# Patient Record
Sex: FEMALE | Race: WHITE | URBAN - METROPOLITAN AREA
[De-identification: names, ages, dates, MRNs, and addresses within clinical notes are randomized per-mention and may not be internally consistent; named-entity substitution may affect disease eponyms.]

---

## 2024-03-05 ENCOUNTER — HOME HEALTH ADMISSION (OUTPATIENT)
Dept: HOME HEALTH SERVICES | Facility: HOME HEALTH | Age: 74
End: 2024-03-05
Payer: MEDICARE

## 2024-03-06 ENCOUNTER — HOME CARE VISIT (OUTPATIENT)
Dept: SCHEDULING | Facility: HOME HEALTH | Age: 74
End: 2024-03-06
Payer: MEDICARE

## 2024-03-06 VITALS
OXYGEN SATURATION: 96 % | SYSTOLIC BLOOD PRESSURE: 142 MMHG | TEMPERATURE: 97.1 F | HEART RATE: 72 BPM | DIASTOLIC BLOOD PRESSURE: 72 MMHG | RESPIRATION RATE: 18 BRPM

## 2024-03-06 PROCEDURE — G0299 HHS/HOSPICE OF RN EA 15 MIN: HCPCS

## 2024-03-06 ASSESSMENT — ENCOUNTER SYMPTOMS
PAIN LOCATION - PAIN QUALITY: ACHY
DYSPNEA ACTIVITY LEVEL: AFTER AMBULATING 10 - 20 FT

## 2024-03-06 NOTE — HOME HEALTH
Patient is a 73 y/o/f with past medical hx of htn, mdd, amy, and self reported bipolar, seen today for rn soc after recent dc from rehab as she fell getting out of her car, presents a+o x3, vs wnl, reports mild back pain, lung sounds clear, does report some sob on exertion, bowel sounds present, reports adequate food/fluid intake, denies issues with elimination or constipation, pt mood mildly depressed as she was living independent prior to moving into [facility], pt currently residing in pcu awaiting villa, pt reports hx of anxiety that is mostly controlled with medication regimen, endorses hx of poor sleep, thought process well organized and goal directed, insight/judgement appears fair, pt agreeable to services, pt added for decreased mobility/endurance, and hx of falls, all consents signed, all questions and concerns addressed, will continue to monitor,   Caregiver involvement: via [facility]  Medications reconciled and all medications are available in nursing station.  Home health supplies by type and quantity ordered/delivered this visit include: na  Patient education provided this visit: SN educated patient and patient's caregiver on admission process, call us first procedure, fall precautions, s/s of infection  Patient and patient caregiver verbalizes understanding via the teach back method.   Progress toward goals: progressing well  Home exercise program: pt/ot eval week of 3/11/24  Plan for next visit: disease/medication management   The following discharge planning was discussed with the patient/ patient's caregiver: DC when goals met

## 2024-03-11 ENCOUNTER — HOME CARE VISIT (OUTPATIENT)
Dept: SCHEDULING | Facility: HOME HEALTH | Age: 74
End: 2024-03-11
Payer: MEDICARE

## 2024-03-11 VITALS — DIASTOLIC BLOOD PRESSURE: 78 MMHG | HEART RATE: 75 BPM | SYSTOLIC BLOOD PRESSURE: 120 MMHG

## 2024-03-11 PROCEDURE — G0153 HHCP-SVS OF S/L PATH,EA 15MN: HCPCS

## 2024-03-11 PROCEDURE — G0151 HHCP-SERV OF PT,EA 15 MIN: HCPCS

## 2024-03-11 NOTE — HOME HEALTH
Patient seen this visit for memory and cognitive evaluation. Patient reports new move in at the [facility]. Patient reports previous hospitalization from a fall that has led her to require increased care. Patient reports poor word finding and short-term memory. Patients judgment and problem solving is poor which can impact patient safety at the [facility]. ST administered the BCAT. Patient recieved a score of 28/50 indicating mild dementia. Patients within this score range typically requires redidential support needs including medication managment and 24/7 supervision. Patients short-term recall and delayed recall requires moderate cues. Patient would like to move to a villa with less caregiver support. Patient would benefit from problem solvig and judgment skills to improve safety and complete ADL's at the [facility].

## 2024-03-12 VITALS
DIASTOLIC BLOOD PRESSURE: 68 MMHG | HEART RATE: 70 BPM | SYSTOLIC BLOOD PRESSURE: 120 MMHG | TEMPERATURE: 97.2 F | RESPIRATION RATE: 18 BRPM

## 2024-03-13 ENCOUNTER — HOME CARE VISIT (OUTPATIENT)
Dept: SCHEDULING | Facility: HOME HEALTH | Age: 74
End: 2024-03-13
Payer: MEDICARE

## 2024-03-13 VITALS
TEMPERATURE: 98.2 F | OXYGEN SATURATION: 94 % | RESPIRATION RATE: 18 BRPM | HEART RATE: 79 BPM | DIASTOLIC BLOOD PRESSURE: 72 MMHG | SYSTOLIC BLOOD PRESSURE: 122 MMHG

## 2024-03-13 PROCEDURE — G0300 HHS/HOSPICE OF LPN EA 15 MIN: HCPCS

## 2024-03-13 PROCEDURE — G0153 HHCP-SVS OF S/L PATH,EA 15MN: HCPCS

## 2024-03-13 ASSESSMENT — ENCOUNTER SYMPTOMS
PAIN LOCATION - PAIN QUALITY: ACHY
HEMOPTYSIS: 0
PAIN LOCATION - PAIN QUALITY: ACHE
HEMOPTYSIS: 0
DYSPNEA ACTIVITY LEVEL: AFTER AMBULATING MORE THAN 20 FT

## 2024-03-13 NOTE — HOME HEALTH
Patient is s/p fall, anxiety, depression, HTN  Pt is alert and oriented, pleasant and cooperative, vss, ambulates with walker, no recent falls reported, good appetite and hydration, lcta, no edema, no sob, pt has history of hemorrhoids, and c/o discomfort at this time, daughter will bring preparation H and is using a soothing barrier  cream, sn instructed pt to avoid constipation, increase fiber and fluid intake. Verbal report given to catherine Del Toro.  Caregiver involvement: pt receives assistance from [facility] staff  Medications reconciled and all medications are available in the facility.  Home health supplies by type and quantity ordered/delivered this visit include: n/a  Patient education provided this visit: SN educated patient and patient's caregiver on safety precautions.  Patient and patient caregiver verbalizes understanding via the teach back method.   Progress toward goals: progressing well  Home exercise program: PT  Plan for next visit: disease process/medication management  The following discharge planning was discussed with the patient/ patient's caregiver: DC when goals met .

## 2024-03-13 NOTE — HOME HEALTH
Patient seen this visit for memory and cognitive function. Patient reports poor orientation to date. ST assisted with content of visual aid for recall of date. Patient encouraged to participate in activities around the facility. Patient is new move in to PCU building and would like to move to an independent villa. Patient instructed on compensatory strategies such as visual aid calendar for orientation and written aids for information she would like to remember. ST reviewed leisure activities and meal times she would like to remember. Patients judgment and problem solving required moderate cues. Patient would be a good candidate for independent living with supported caregiver support for meds and other ADL's. Patient reports she would like to be independent and continue working on returning to baseline from previous hospitalization. Patient participated and was compliant at this encounter.

## 2024-03-13 NOTE — HOME HEALTH
Patient is a recent move into skilled nursing setting from independent home due to fall, increased difficulty caring for herself.  Patient is able to ambulate slowly without AD, rigid trunk, minimal arm swing, decreased stride length.  Patient states she was experiencing right sided rib pain from her fall but that pain has improved significantly over the last few day.  Patient would like to return to home setting.  Will benefit from skilled PT to improve strength, balance and endurance to allow return to safe, independent living situation

## 2024-03-14 ENCOUNTER — HOME CARE VISIT (OUTPATIENT)
Dept: HOME HEALTH SERVICES | Facility: HOME HEALTH | Age: 74
End: 2024-03-14
Payer: MEDICARE

## 2024-03-14 PROCEDURE — G0151 HHCP-SERV OF PT,EA 15 MIN: HCPCS

## 2024-03-16 VITALS
DIASTOLIC BLOOD PRESSURE: 78 MMHG | SYSTOLIC BLOOD PRESSURE: 130 MMHG | RESPIRATION RATE: 18 BRPM | TEMPERATURE: 97.2 F | HEART RATE: 70 BPM

## 2024-03-16 NOTE — HOME HEALTH
Patient seen for skilled PT with focus on outdoor ambulation.  Patient demonstrates decreased stride, NBOS, decreased HS, decreased arm swing.  Patient was able to ambulate 500 feet, outdoors over uneven surfaces with close SBA.  Patient states she is having GI issues (constipation) which is decreasing her ability to participate in activities within shelter setting.  Patient will benefit from cont skilled PT to improve gait pattern to improve safety, cont with path finding within shelter setting to allow patient to be more independent within [facility]

## 2024-03-18 ENCOUNTER — HOME CARE VISIT (OUTPATIENT)
Dept: HOME HEALTH SERVICES | Facility: HOME HEALTH | Age: 74
End: 2024-03-18
Payer: MEDICARE

## 2024-03-18 VITALS — SYSTOLIC BLOOD PRESSURE: 135 MMHG | DIASTOLIC BLOOD PRESSURE: 78 MMHG

## 2024-03-18 PROCEDURE — G0153 HHCP-SVS OF S/L PATH,EA 15MN: HCPCS

## 2024-03-18 NOTE — HOME HEALTH
Patient seen this visit for memory and cognitive function. Patient was oriented with help from visual aid. ST assisted with content of visual aid for recall of date. Patient encouraged to participate in activities around the facility. Patient instructed on compensatory strategies such as visual aid calendar for orientation and written aids for information she would like to remember. ST reviewed leisure activities and meal times she would like to remember. Patients judgment and problem solving required moderate cues. ST assisted with content of visual aid for activities, patient reports still learning the new [facility] and rules she has to follow. Patient encouraged to establish routine for the day to aid in ADL reminders.

## 2024-03-20 ENCOUNTER — HOME CARE VISIT (OUTPATIENT)
Dept: HOME HEALTH SERVICES | Facility: HOME HEALTH | Age: 74
End: 2024-03-20
Payer: MEDICARE

## 2024-03-20 ENCOUNTER — HOME CARE VISIT (OUTPATIENT)
Dept: SCHEDULING | Facility: HOME HEALTH | Age: 74
End: 2024-03-20
Payer: MEDICARE

## 2024-03-20 VITALS — SYSTOLIC BLOOD PRESSURE: 137 MMHG | DIASTOLIC BLOOD PRESSURE: 78 MMHG | HEART RATE: 67 BPM

## 2024-03-20 PROCEDURE — G0153 HHCP-SVS OF S/L PATH,EA 15MN: HCPCS

## 2024-03-20 PROCEDURE — G0151 HHCP-SERV OF PT,EA 15 MIN: HCPCS

## 2024-03-20 PROCEDURE — G0299 HHS/HOSPICE OF RN EA 15 MIN: HCPCS

## 2024-03-20 NOTE — HOME HEALTH
Patient seen this visit for memory and cognitive function. Patient was oriented indepedently. Patient encouraged to participate in activities around the facility. Patient instructed on compensatory strategies such as visual aid calendar for orientation and written aids for information she would like to remember. ST reviewed leisure activities and meal times she would like to remember. Patient reports she would like to remember how to get to the dining room and laundry room. ST assisted with path finding to get there indepedently. Patient encouraged to be indepedent with meals and other ADL's. Patient reports she would like and an indepedent villa with supported services including medication managment. Patient appears to be a good candidate for these services.

## 2024-03-21 ENCOUNTER — HOME CARE VISIT (OUTPATIENT)
Dept: HOME HEALTH SERVICES | Facility: HOME HEALTH | Age: 74
End: 2024-03-21
Payer: MEDICARE

## 2024-03-21 PROCEDURE — G0151 HHCP-SERV OF PT,EA 15 MIN: HCPCS

## 2024-03-22 VITALS
HEART RATE: 70 BPM | TEMPERATURE: 97.8 F | DIASTOLIC BLOOD PRESSURE: 76 MMHG | SYSTOLIC BLOOD PRESSURE: 108 MMHG | SYSTOLIC BLOOD PRESSURE: 122 MMHG | DIASTOLIC BLOOD PRESSURE: 68 MMHG | RESPIRATION RATE: 16 BRPM | RESPIRATION RATE: 18 BRPM | TEMPERATURE: 98.2 F | HEART RATE: 70 BPM

## 2024-03-22 NOTE — HOME HEALTH
Patient states she is feeling much better, no pain.  Presents with slow al with ambulation, requires SBA for path finding due to new living environment.  Patient requires frequent seated rest periods during standing activities.  Cont skilled PT medically necessary to improve CV endurance to allow safe move to [facility]

## 2024-03-22 NOTE — HOME HEALTH
Patient conts to demonstrate increased endurance, is now doing her own laundry within the [facility] setting.  Patient is able to ambulate without AD to/from dining room without AD.  Patient progressing towards goals and will be discharged next week unless change in medical status occurs

## 2024-03-25 ENCOUNTER — HOME CARE VISIT (OUTPATIENT)
Dept: HOME HEALTH SERVICES | Facility: HOME HEALTH | Age: 74
End: 2024-03-25
Payer: MEDICARE

## 2024-03-25 PROCEDURE — G0299 HHS/HOSPICE OF RN EA 15 MIN: HCPCS

## 2024-03-25 PROCEDURE — G0151 HHCP-SERV OF PT,EA 15 MIN: HCPCS

## 2024-03-27 ENCOUNTER — HOME CARE VISIT (OUTPATIENT)
Dept: HOME HEALTH SERVICES | Facility: HOME HEALTH | Age: 74
End: 2024-03-27
Payer: MEDICARE

## 2024-03-27 VITALS — DIASTOLIC BLOOD PRESSURE: 78 MMHG | SYSTOLIC BLOOD PRESSURE: 134 MMHG | HEART RATE: 76 BPM

## 2024-03-27 PROCEDURE — G0153 HHCP-SVS OF S/L PATH,EA 15MN: HCPCS

## 2024-03-27 NOTE — HOME HEALTH
Patient seen this visit for memory and cognitive therapy and ST discipline discharge. Patient has made improvements with all goals including short-term memory, immediate and delayed recall, problem solving and judgment tasks. Patient is a good candidate for independent living at the [facility] with medication management and other supportive services. Patient is oriented with help from visual aid. Patient is able to utilize compensatory strategies such as written and visual aids to support memory. Goals are adequate for discharge. No further ST treatments scheduled.

## 2024-03-28 ENCOUNTER — HOME CARE VISIT (OUTPATIENT)
Dept: SCHEDULING | Facility: HOME HEALTH | Age: 74
End: 2024-03-28
Payer: MEDICARE

## 2024-03-28 PROCEDURE — G0151 HHCP-SERV OF PT,EA 15 MIN: HCPCS

## 2024-03-30 VITALS
SYSTOLIC BLOOD PRESSURE: 122 MMHG | HEART RATE: 70 BPM | DIASTOLIC BLOOD PRESSURE: 70 MMHG | TEMPERATURE: 97.2 F | RESPIRATION RATE: 18 BRPM

## 2024-03-30 VITALS
RESPIRATION RATE: 18 BRPM | DIASTOLIC BLOOD PRESSURE: 70 MMHG | HEART RATE: 72 BPM | TEMPERATURE: 98.2 F | SYSTOLIC BLOOD PRESSURE: 122 MMHG

## 2024-03-30 ASSESSMENT — ENCOUNTER SYMPTOMS
DYSPNEA ACTIVITY LEVEL: AFTER AMBULATING MORE THAN 20 FT
PAIN LOCATION - PAIN QUALITY: ACHE

## 2024-03-30 NOTE — HOME HEALTH
Patient seen for skilled PT visit.  Patient reports she is able to ambulate to/from DR for meals.  Is doing her own laundry and is taking her own showers. Patient given HEP  Patient has adjusted well to new environment.  If no change in status will be discharged from PT next visit.

## 2024-03-30 NOTE — HOME HEALTH
Patient seen for skilled PT discharge visit.  Patient is able to return demonstrate HEP, is able to ambulate safely to/from DR for meals.  Patient is completing all ADL's.  [facility] conts to manage medications.  Patient will be discharged at this time with goals met

## 2024-04-03 ENCOUNTER — HOME CARE VISIT (OUTPATIENT)
Dept: SCHEDULING | Facility: HOME HEALTH | Age: 74
End: 2024-04-03
Payer: MEDICARE

## 2024-04-03 PROCEDURE — G0299 HHS/HOSPICE OF RN EA 15 MIN: HCPCS

## 2024-04-04 VITALS
OXYGEN SATURATION: 98 % | RESPIRATION RATE: 18 BRPM | TEMPERATURE: 97.3 F | HEART RATE: 72 BPM | DIASTOLIC BLOOD PRESSURE: 62 MMHG | SYSTOLIC BLOOD PRESSURE: 124 MMHG

## 2024-04-04 NOTE — HOME HEALTH
Patient DC from [facility] services with all goals met. SN provided education on medication, fall precautions, s/s of infection, anxiety, depression, post op complications, HTN, and medication management. Patient verbalized understanding of education provided and when to call MD. PT/OT provided therapy, patient tolerated well.